# Patient Record
Sex: FEMALE | Race: WHITE | NOT HISPANIC OR LATINO | URBAN - METROPOLITAN AREA
[De-identification: names, ages, dates, MRNs, and addresses within clinical notes are randomized per-mention and may not be internally consistent; named-entity substitution may affect disease eponyms.]

---

## 2022-07-28 ENCOUNTER — APPOINTMENT (RX ONLY)
Dept: URBAN - METROPOLITAN AREA CLINIC 292 | Facility: CLINIC | Age: 22
Setting detail: DERMATOLOGY
End: 2022-07-28

## 2022-07-28 DIAGNOSIS — D485 NEOPLASM OF UNCERTAIN BEHAVIOR OF SKIN: ICD-10-CM

## 2022-07-28 PROBLEM — D48.5 NEOPLASM OF UNCERTAIN BEHAVIOR OF SKIN: Status: ACTIVE | Noted: 2022-07-28

## 2022-07-28 PROCEDURE — ? BIOPSY BY SHAVE METHOD

## 2022-07-28 PROCEDURE — ? ADDITIONAL NOTES

## 2022-07-28 PROCEDURE — 11102 TANGNTL BX SKIN SINGLE LES: CPT

## 2022-07-28 ASSESSMENT — LOCATION ZONE DERM: LOCATION ZONE: LEG

## 2022-07-28 ASSESSMENT — LOCATION SIMPLE DESCRIPTION DERM: LOCATION SIMPLE: RIGHT PRETIBIAL REGION

## 2022-07-28 ASSESSMENT — LOCATION DETAILED DESCRIPTION DERM: LOCATION DETAILED: RIGHT DISTAL PRETIBIAL REGION

## 2022-07-28 NOTE — PROCEDURE: MIPS QUALITY
Quality 226: Preventive Care And Screening: Tobacco Use: Screening And Cessation Intervention: Patient screened for tobacco use and is an ex/non-smoker
Quality 130: Documentation Of Current Medications In The Medical Record: Current Medications Documented
Quality 47: Advance Care Plan: Advance care planning not documented, reason not otherwise specified.
Quality 431: Preventive Care And Screening: Unhealthy Alcohol Use - Screening: Patient not identified as an unhealthy alcohol user when screened for unhealthy alcohol use using a systematic screening method
Detail Level: Detailed

## 2022-07-28 NOTE — HPI: SKIN LESION
What Type Of Note Output Would You Prefer (Optional)?: Standard Output
How Severe Is Your Skin Lesion?: mild
Has Your Skin Lesion Been Treated?: not been treated
Is This A New Presentation, Or A Follow-Up?: Skin Lesion Referral
Who Is Your Referring Provider?: Referral From: Maris Grider NP
Which Family Member (Optional)?: Great grandfather

## 2022-07-28 NOTE — PROCEDURE: BIOPSY BY SHAVE METHOD
Detail Level: Detailed
Depth Of Biopsy: dermis
Was A Bandage Applied: Yes
Size Of Lesion In Cm: 0
Biopsy Type: H and E
Biopsy Method: curette
Anesthesia Volume In Cc (Will Not Render If 0): 0.5
Hemostasis: Anna's
Wound Care: Petrolatum
Dressing: no dressing applied
Destruction After The Procedure: No
Type Of Destruction Used: Curettage
Curettage Text: The wound bed was treated with curettage after the biopsy was performed.
Cryotherapy Text: The wound bed was treated with cryotherapy after the biopsy was performed.
Electrodesiccation Text: The wound bed was treated with electrodesiccation after the biopsy was performed.
Electrodesiccation And Curettage Text: The wound bed was treated with electrodesiccation and curettage after the biopsy was performed.
Silver Nitrate Text: The wound bed was treated with silver nitrate after the biopsy was performed.
Lab: 6
Lab Facility: [id number]
Consent: Written consent was obtained and risks were reviewed including but not limited to scarring, infection, bleeding, scabbing, incomplete removal, nerve damage and allergy to anesthesia.
Post-Care Instructions: I reviewed with the patient in detail post-care instructions. Patient is to keep the biopsy site dry overnight, and then apply bacitracin twice daily until healed. Patient may apply hydrogen peroxide soaks to remove any crusting.
Notification Instructions: Patient will be notified of biopsy results. However, patient instructed to call the office if not contacted within 2 weeks.
Billing Type: Third-Party Bill
Information: Selecting Yes will display possible errors in your note based on the variables you have selected. This validation is only offered as a suggestion for you. PLEASE NOTE THAT THE VALIDATION TEXT WILL BE REMOVED WHEN YOU FINALIZE YOUR NOTE. IF YOU WANT TO FAX A PRELIMINARY NOTE YOU WILL NEED TO TOGGLE THIS TO 'NO' IF YOU DO NOT WANT IT IN YOUR FAXED NOTE.

## 2023-01-23 LAB
AMB EXT CHLAMYDIA TRACHOMATIS ANTIGEN: NEGATIVE
AMB EXT THINPREP PAP: NEGATIVE

## 2024-03-26 ENCOUNTER — LAB ENCOUNTER (OUTPATIENT)
Dept: LAB | Age: 24
End: 2024-03-26
Attending: STUDENT IN AN ORGANIZED HEALTH CARE EDUCATION/TRAINING PROGRAM
Payer: COMMERCIAL

## 2024-03-26 ENCOUNTER — OFFICE VISIT (OUTPATIENT)
Dept: FAMILY MEDICINE CLINIC | Facility: CLINIC | Age: 24
End: 2024-03-26
Payer: COMMERCIAL

## 2024-03-26 VITALS
WEIGHT: 237 LBS | RESPIRATION RATE: 16 BRPM | DIASTOLIC BLOOD PRESSURE: 80 MMHG | HEART RATE: 79 BPM | OXYGEN SATURATION: 97 % | HEIGHT: 65 IN | BODY MASS INDEX: 39.49 KG/M2 | SYSTOLIC BLOOD PRESSURE: 124 MMHG | TEMPERATURE: 97 F

## 2024-03-26 DIAGNOSIS — Z23 NEED FOR TDAP VACCINATION: ICD-10-CM

## 2024-03-26 DIAGNOSIS — Z00.00 WELL ADULT EXAM: Primary | ICD-10-CM

## 2024-03-26 DIAGNOSIS — E28.2 PCOS (POLYCYSTIC OVARIAN SYNDROME): ICD-10-CM

## 2024-03-26 LAB
FSH SERPL-ACNC: 7.3 MIU/ML
PROGEST SERPL-MCNC: 0.92 NG/ML

## 2024-03-26 PROCEDURE — 82671 ASSAY OF ESTROGENS: CPT

## 2024-03-26 PROCEDURE — 99385 PREV VISIT NEW AGE 18-39: CPT | Performed by: STUDENT IN AN ORGANIZED HEALTH CARE EDUCATION/TRAINING PROGRAM

## 2024-03-26 PROCEDURE — 83001 ASSAY OF GONADOTROPIN (FSH): CPT

## 2024-03-26 PROCEDURE — 3008F BODY MASS INDEX DOCD: CPT | Performed by: STUDENT IN AN ORGANIZED HEALTH CARE EDUCATION/TRAINING PROGRAM

## 2024-03-26 PROCEDURE — 84402 ASSAY OF FREE TESTOSTERONE: CPT

## 2024-03-26 PROCEDURE — 3079F DIAST BP 80-89 MM HG: CPT | Performed by: STUDENT IN AN ORGANIZED HEALTH CARE EDUCATION/TRAINING PROGRAM

## 2024-03-26 PROCEDURE — 84403 ASSAY OF TOTAL TESTOSTERONE: CPT

## 2024-03-26 PROCEDURE — 3074F SYST BP LT 130 MM HG: CPT | Performed by: STUDENT IN AN ORGANIZED HEALTH CARE EDUCATION/TRAINING PROGRAM

## 2024-03-26 PROCEDURE — 84144 ASSAY OF PROGESTERONE: CPT

## 2024-03-26 PROCEDURE — 90471 IMMUNIZATION ADMIN: CPT | Performed by: STUDENT IN AN ORGANIZED HEALTH CARE EDUCATION/TRAINING PROGRAM

## 2024-03-26 PROCEDURE — 90715 TDAP VACCINE 7 YRS/> IM: CPT | Performed by: STUDENT IN AN ORGANIZED HEALTH CARE EDUCATION/TRAINING PROGRAM

## 2024-03-26 PROCEDURE — 36415 COLL VENOUS BLD VENIPUNCTURE: CPT

## 2024-03-26 NOTE — PROGRESS NOTES
Subjective:      Chief Complaint   Patient presents with    Cyst     She thought it was a bartholin's cyst but states after her period is went away    Physical     Last pap done 2023 with Dr. Aria Helms in [facility] [place]     HISTORY OF PRESENT ILLNESS  Cyst      HPI obtained per patient report.  Cherise Dsouza is a pleasant 23 year old female presenting for her annual physical.   She recently moved from [place].   She initially made her appointment for a vulvar cyst, but this symptom has since resolved spontaneously.   She reports irregular periods and acne associated with her PCOS. She tried OCPs and metformin but was unable to tolerate these medications. She has not had her hormone levels checked since her PCOS diagnosis around 6 years ago and wishes to check these levels.     PAST PATIENT HISTORY  Past Medical History:   Diagnosis Date    PCOS (polycystic ovarian syndrome)      Past Surgical History:   Procedure Laterality Date    APPENDECTOMY      OTHER SURGICAL HISTORY      pins on elbow       CURRENT MEDICATIONS  No outpatient medications have been marked as taking for the 3/26/24 encounter (Office Visit) with Latisha Cali MD.       HEALTH MAINTENANCE  Immunization History   Administered Date(s) Administered    >=3 YRS TRI  MULTIDOSE VIAL (12318) FLU CLINIC 11/18/2004, 10/05/2005, 11/06/2006, 10/29/2007    Covid-19 Vaccine Moderna 100 mcg/0.5 ml 05/12/2021, 06/09/2021    Covid-19 Vaccine Pfizer 30 mcg/0.3 ml 01/04/2022    DTAP INFANRIX 11/12/2001, 01/14/2002, 03/13/2002, 04/09/2003, 03/22/2007    FLUMIST NASAL 2 YR-49 YRS (23618) 08/14/2013, 01/05/2015    FLUZONE 6 months and older PFS 0.5 ml (37366) 09/01/2017, 01/04/2022, 01/23/2023    HEP A,Ped/Adol,(2 Dose) 03/29/2006, 11/06/2006    HEP B, Ped/Adol 06/12/2002    HEP B/HIB 11/12/2001, 01/14/2002    HIB (3 Dose) 03/13/2002, 12/16/2002    HPV (Gardasil) 08/08/2012, 11/19/2012, 04/16/2013    IPV 11/12/2001, 01/14/2002, 03/13/2002, 03/22/2007    Influenza  10/11/2020    Influenza A, H1N1 Vaccine 02/08/2010    Intranasal (CPT=90660), Influenza Vaccine, Flu Clinic 08/08/2012    MMR 09/20/2002, 10/05/2005    Meningococcal-Menactra 08/08/2012, 09/11/2017    Pneumococcal (Prevnar 7) 11/12/2001, 01/14/2002, 06/12/2002    TDAP 08/08/2012, 03/26/2024    Varicella 09/20/2002, 11/06/2006       ALLERGIES AND DRUG REACTIONS  No Known Allergies    Family History   Problem Relation Age of Onset    Anemia Mother     Asthma Father     Diabetes Maternal Grandfather     Obesity Maternal Grandfather     Diabetes Maternal Grandmother     Dementia Paternal Grandfather         Alcohol induced    Asthma Paternal Grandmother     Diabetes Paternal Grandmother      Social History     Socioeconomic History    Marital status: Single   Tobacco Use    Smoking status: Never    Smokeless tobacco: Never   Vaping Use    Vaping Use: Never used   Substance and Sexual Activity    Alcohol use: Not Currently    Drug use: Never   Other Topics Concern    Caffeine Concern No    Exercise No    Seat Belt Yes    Special Diet No    Stress Concern No    Weight Concern No       Review of Systems   Genitourinary:  Positive for menstrual problem.   All other systems reviewed and are negative.         Objective:      /80   Pulse 79   Temp 97.2 °F (36.2 °C) (Temporal)   Resp 16   Ht 5' 5\" (1.651 m)   Wt 237 lb (107.5 kg)   LMP 03/19/2024 (Approximate)   SpO2 97%   BMI 39.44 kg/m²   Body mass index is 39.44 kg/m².    Physical Exam  Vitals reviewed.   Constitutional:       General: She is not in acute distress.     Appearance: She is not ill-appearing, toxic-appearing or diaphoretic.   HENT:      Head: Normocephalic and atraumatic.   Eyes:      General: No scleral icterus.        Right eye: No discharge.         Left eye: No discharge.      Extraocular Movements: Extraocular movements intact.      Conjunctiva/sclera: Conjunctivae normal.   Cardiovascular:      Rate and Rhythm: Normal rate and regular  rhythm.      Heart sounds: Normal heart sounds.   Pulmonary:      Effort: Pulmonary effort is normal.      Breath sounds: Normal breath sounds.   Abdominal:      General: Bowel sounds are normal. There is no distension.      Palpations: Abdomen is soft. There is no mass.      Tenderness: There is no abdominal tenderness. There is no guarding or rebound.   Musculoskeletal:      Cervical back: Neck supple.      Right lower leg: No edema.      Left lower leg: No edema.   Neurological:      Mental Status: She is alert and oriented to person, place, and time.   Psychiatric:         Mood and Affect: Mood normal.            Assessment and Plan:      1. Well adult exam (Primary)  2. Need for Tdap vaccination  -     TETANUS, DIPHTHERIA TOXOIDS AND ACELLULAR PERTUSIS VACCINE (TDAP), >7 YEARS, IM USE  3. PCOS (polycystic ovarian syndrome)  -     Testosterone, Total And Free; Future; Expected date: 03/26/2024  -     FSH; Future; Expected date: 03/26/2024  -     Estrogens Fractionated, Serum; Future; Expected date: 03/26/2024  -     Progesterone; Future; Expected date: 03/26/2024    Return in about 1 year (around 3/26/2025).    - R/B/A of Tdap booster were discussed; administered with pt's consent  - lab orders provided per pt request. We discussed other treatment options for PCOS and acne including metformin ER, spironolactone, and topicals; she would like to defer these at this time    Patient verbalized understanding of assessment and recommendations. All questions and concerns were addressed.    Electronically signed by Latisha Cali MD

## 2024-03-29 LAB
FREE TESTOST DIRECT: 1.7 PG/ML
TESTOSTERONE: 26 NG/DL

## 2024-04-01 LAB
ESTRADIOL: 34 PG/ML
ESTRONE: 94 PG/ML

## 2025-04-03 ENCOUNTER — PATIENT MESSAGE (OUTPATIENT)
Dept: FAMILY MEDICINE CLINIC | Facility: CLINIC | Age: 25
End: 2025-04-03

## 2025-04-24 ENCOUNTER — OFFICE VISIT (OUTPATIENT)
Dept: FAMILY MEDICINE CLINIC | Facility: CLINIC | Age: 25
End: 2025-04-24
Payer: COMMERCIAL

## 2025-04-24 VITALS
HEART RATE: 66 BPM | DIASTOLIC BLOOD PRESSURE: 70 MMHG | HEIGHT: 65 IN | OXYGEN SATURATION: 99 % | TEMPERATURE: 97 F | SYSTOLIC BLOOD PRESSURE: 100 MMHG | WEIGHT: 247 LBS | BODY MASS INDEX: 41.15 KG/M2 | RESPIRATION RATE: 16 BRPM

## 2025-04-24 DIAGNOSIS — E28.2 PCOS (POLYCYSTIC OVARIAN SYNDROME): ICD-10-CM

## 2025-04-24 DIAGNOSIS — Z13.220 SCREENING CHOLESTEROL LEVEL: ICD-10-CM

## 2025-04-24 DIAGNOSIS — Z00.00 WELL ADULT EXAM: Primary | ICD-10-CM

## 2025-04-24 DIAGNOSIS — E66.01 MORBID OBESITY (HCC): ICD-10-CM

## 2025-04-24 DIAGNOSIS — Z13.31 NEGATIVE DEPRESSION SCREENING: ICD-10-CM

## 2025-04-24 DIAGNOSIS — Z13.1 DIABETES MELLITUS SCREENING: ICD-10-CM

## 2025-04-24 NOTE — PROGRESS NOTES
Subjective:      Chief Complaint   Patient presents with    Physical     Would like to get CGM for PCOS     HISTORY OF PRESENT ILLNESS  HPI  HPI obtained per patient report.  Cherise Dsouza is a pleasant 24 year old female presenting for a physical.   She inquires about obtaining a CGM to monitor her BG levels given her history of PCOS.     PAST PATIENT HISTORY  Past Medical History[1]  Past Surgical History[2]    CURRENT MEDICATIONS  Medications Taking[3]    HEALTH MAINTENANCE  Immunization History   Administered Date(s) Administered    >=3 YRS TRI  MULTIDOSE VIAL (23305) FLU CLINIC 11/18/2004, 10/05/2005, 11/06/2006, 10/29/2007    Covid-19 Vaccine Moderna 100 mcg/0.5 ml 05/12/2021, 06/09/2021    Covid-19 Vaccine Pfizer 30 mcg/0.3 ml 01/04/2022    DTAP 11/12/2001, 01/14/2002, 03/13/2002, 04/09/2003, 03/22/2007    FLUMIST NASAL 2 YR-49 YRS (35454) 08/14/2013, 01/05/2015    FLUZONE 6 months and older PFS 0.5 ml (41186) 09/01/2017, 01/04/2022, 01/23/2023    HEP A,Ped/Adol,(2 Dose) 03/29/2006, 11/06/2006    HEP B, Ped/Adol 06/12/2002    HEP B/HIB 11/12/2001, 01/14/2002    HIB PRP-OMP 3 Dose 03/13/2002, 12/16/2002    HPV (Gardasil) 08/08/2012, 11/19/2012, 04/16/2013    IPV 11/12/2001, 01/14/2002, 03/13/2002, 03/22/2007    Influenza 10/11/2020    Influenza A, H1N1 Vaccine 02/08/2010    Intranasal (CPT=90660), Influenza Vaccine, Flu Clinic 08/08/2012    MMR 09/20/2002, 10/05/2005    Meningococcal-Menactra 08/08/2012, 09/11/2017    Pneumococcal (Prevnar 7) 11/12/2001, 01/14/2002, 06/12/2002    TDAP 08/08/2012, 03/26/2024    Varicella 09/20/2002, 11/06/2006       ALLERGIES AND DRUG REACTIONS  Allergies[4]    Family History[5]  Short Social Hx on File[6]    Review of Systems   All other systems reviewed and are negative.         Objective:      /70   Pulse 66   Temp 97.3 °F (36.3 °C) (Temporal)   Resp 16   Ht 5' 5\" (1.651 m)   Wt 247 lb (112 kg)   LMP 02/20/2025 (Approximate)   SpO2 99%   BMI 41.10 kg/m²   Body  mass index is 41.1 kg/m².    Physical Exam  Vitals reviewed.   Constitutional:       General: She is not in acute distress.     Appearance: She is not ill-appearing, toxic-appearing or diaphoretic.   HENT:      Head: Normocephalic and atraumatic.   Eyes:      General: No scleral icterus.        Right eye: No discharge.         Left eye: No discharge.      Extraocular Movements: Extraocular movements intact.      Conjunctiva/sclera: Conjunctivae normal.      Pupils: Pupils are equal, round, and reactive to light.   Neck:      Thyroid: No thyroid mass, thyromegaly or thyroid tenderness.   Cardiovascular:      Rate and Rhythm: Normal rate and regular rhythm.      Heart sounds: Normal heart sounds.   Pulmonary:      Effort: Pulmonary effort is normal.      Breath sounds: Normal breath sounds.   Abdominal:      General: Bowel sounds are normal. There is no distension.      Palpations: Abdomen is soft. There is no mass.      Tenderness: There is no abdominal tenderness. There is no guarding or rebound.   Musculoskeletal:      Cervical back: Neck supple. No tenderness.      Right lower leg: No edema.      Left lower leg: No edema.   Lymphadenopathy:      Cervical: No cervical adenopathy.   Skin:     General: Skin is warm and dry.      Coloration: Skin is not jaundiced or pale.      Findings: No bruising, erythema or rash.   Neurological:      Mental Status: She is alert and oriented to person, place, and time.   Psychiatric:         Mood and Affect: Mood normal.            Assessment and Plan:      1. Well adult exam (Primary)  -     CBC With Differential With Platelet; Future; Expected date: 04/24/2025  -     Comp Metabolic Panel (14); Future; Expected date: 04/24/2025  -     Lipid Panel; Future; Expected date: 04/24/2025  -     Hemoglobin A1C; Future; Expected date: 04/24/2025  -     TSH W Reflex To Free T4; Future; Expected date: 04/24/2025  2. Screening cholesterol level  -     Lipid Panel; Future; Expected date:  04/24/2025  3. Diabetes mellitus screening  -     Hemoglobin A1C; Future; Expected date: 04/24/2025  4. Negative depression screening  5. PCOS (polycystic ovarian syndrome)  6. Morbid obesity (HCC)    Return in about 1 year (around 4/24/2026) for physical.    - she is overall doing well  - annual lab orders were discussed and updated today  - will place an order for a CGM per pt's request, but a shared decision was made to await her updated lab results in order to order under possible specific PCOS comorbidities   - she is UTD on her pap smears and immunizations     Patient verbalized understanding of assessment and recommendations. All questions and concerns were addressed.    Electronically signed by Latisha Cali MD         [1]   Past Medical History:   PCOS (polycystic ovarian syndrome)   [2]   Past Surgical History:  Procedure Laterality Date    Appendectomy      Other surgical history      pins on elbow   [3]   No outpatient medications have been marked as taking for the 4/24/25 encounter (Office Visit) with Latisha Cali MD.   [4] No Known Allergies  [5]   Family History  Problem Relation Age of Onset    Anemia Mother     Asthma Father     Diabetes Maternal Grandfather     Obesity Maternal Grandfather     Diabetes Maternal Grandmother     Dementia Paternal Grandfather         Alcohol induced    Asthma Paternal Grandmother     Diabetes Paternal Grandmother    [6]   Social History  Socioeconomic History    Marital status: Single   Tobacco Use    Smoking status: Never    Smokeless tobacco: Never   Vaping Use    Vaping status: Never Used   Substance and Sexual Activity    Alcohol use: Not Currently    Drug use: Never   Other Topics Concern    Caffeine Concern No    Exercise No    Seat Belt Yes    Special Diet No    Stress Concern No    Weight Concern Yes